# Patient Record
Sex: MALE | Race: WHITE | NOT HISPANIC OR LATINO | Employment: UNEMPLOYED | URBAN - METROPOLITAN AREA
[De-identification: names, ages, dates, MRNs, and addresses within clinical notes are randomized per-mention and may not be internally consistent; named-entity substitution may affect disease eponyms.]

---

## 2023-01-05 ENCOUNTER — OFFICE VISIT (OUTPATIENT)
Dept: FAMILY MEDICINE CLINIC | Facility: CLINIC | Age: 43
End: 2023-01-05

## 2023-01-05 VITALS
DIASTOLIC BLOOD PRESSURE: 82 MMHG | WEIGHT: 185 LBS | HEART RATE: 76 BPM | HEIGHT: 69 IN | TEMPERATURE: 97.8 F | SYSTOLIC BLOOD PRESSURE: 118 MMHG | RESPIRATION RATE: 16 BRPM | BODY MASS INDEX: 27.4 KG/M2 | OXYGEN SATURATION: 95 %

## 2023-01-05 DIAGNOSIS — Z79.899 MEDICAL MARIJUANA USE: Primary | ICD-10-CM

## 2023-01-05 DIAGNOSIS — F41.9 ANXIETY: ICD-10-CM

## 2023-01-05 DIAGNOSIS — F19.11 HISTORY OF DRUG ABUSE (HCC): ICD-10-CM

## 2023-01-05 DIAGNOSIS — Z83.3 FH: DIABETES MELLITUS: ICD-10-CM

## 2023-01-05 DIAGNOSIS — Z72.0 TOBACCO ABUSE: ICD-10-CM

## 2023-01-05 DIAGNOSIS — Z13.220 SCREENING FOR CHOLESTEROL LEVEL: ICD-10-CM

## 2023-01-05 DIAGNOSIS — Z11.3 SCREENING FOR STD (SEXUALLY TRANSMITTED DISEASE): ICD-10-CM

## 2023-01-05 DIAGNOSIS — E66.3 OVERWEIGHT (BMI 25.0-29.9): ICD-10-CM

## 2023-01-05 DIAGNOSIS — Z13.29 SCREENING FOR THYROID DISORDER: ICD-10-CM

## 2023-01-05 NOTE — ASSESSMENT & PLAN NOTE
BMI 27 32  Recommend 150 minutes of moderate aerobic exercise a week  Educated on portion control, increase fruits and vegetables, reducing fast food and carbonated beverages, and consuming healthier snacks

## 2023-01-05 NOTE — ASSESSMENT & PLAN NOTE
Chronic controlled using medical marijuana  Is interested in seeing psych  Denies suicidal/homicidal thoughts/plans, self-harm, hallucinations  Patient does not actively use right relaxation techniques    Educated on crisis hotline and  [id number], referral placed to psych, educated on process

## 2023-01-05 NOTE — PROGRESS NOTES
Name: Trina Nichols      : 1980      MRN: [id number]  Encounter Provider: MARISEL Burden  Encounter Date: 2023   Encounter department: [facility]     1  Medical marijuana use  Assessment & Plan:  Patient current user of medical marijuana has card from previous location [place]  Patient uses medical marijuana to control anxiety symptoms  Educated on proper use, addiction, and side effects, patient follows up with physician in [place], contacted state to transfer medical marijuana card to [place]  2  History of drug abuse [facility])  Assessment & Plan:  Chronic history of drug abuse addiction to cocaine ketamine opioids prior to 2012-2014 addicted to opioid medication  Clean since 2016 with 1 relapse  Has been attending group therapy and outpatient treatment since 2016, is looking for local treatment, provided patient with information  Orders:  -     Ambulatory Referral to Psychiatry; Future    3  Overweight (BMI 25 0-29  9)  Assessment & Plan:  BMI 27 32  Recommend 150 minutes of moderate aerobic exercise a week  Educated on portion control, increase fruits and vegetables, reducing fast food and carbonated beverages, and consuming healthier snacks  Orders:  -     CBC and differential; Future  -     Comprehensive metabolic panel; Future  -     CBC and differential  -     Comprehensive metabolic panel    4  FH: diabetes mellitus  Assessment & Plan:  Family history of diabetes in mother and sister denies polydipsia polyphagia or polyuria  Will recommend hemoglobin A1c fasting glucose and fasting insulin, orders placed    Orders:  -     Hemoglobin A1C; Future  -     Insulin, fasting; Future    5  Anxiety  Assessment & Plan:  Chronic controlled using medical marijuana  Is interested in seeing psych  Denies suicidal/homicidal thoughts/plans, self-harm, hallucinations    Patient does not actively use right relaxation techniques  Educated on crisis hotline and  [id number], referral placed to psych, educated on process    Orders:  -     Ambulatory Referral to Psychiatry; Future    6  Screening for thyroid disorder  -     TSH, 3rd generation with Free T4 reflex; Future  -     TSH, 3rd generation with Free T4 reflex    7  Screening for cholesterol level  -     Lipid Panel with Direct LDL reflex; Future  -     Lipid Panel with Direct LDL reflex    8  Screening for STD (sexually transmitted disease)  -     Hepatitis C antibody; Future  -     : HIV 1/2 AB/AG w Reflex [facility] for 2 yr old and above; Future    9  Tobacco abuse  Assessment & Plan:  Chronic tobacco user smoking approximately 1 pack/day since 1993  Not willing to quit at this time, educated on smoking sensation, medications to assist, and detrimental health, patient not willing to at this time will review evaluated next visit      BMI Counseling: Body mass index is 27 32 kg/m²  The BMI is above normal  Nutrition recommendations include decreasing portion sizes, encouraging healthy choices of fruits and vegetables, consuming healthier snacks and increasing intake of lean protein  Exercise recommendations include moderate physical activity 150 minutes/week  No pharmacotherapy was ordered  Rationale for BMI follow-up plan is due to patient being overweight or obese  Depression Screening and Follow-up Plan: Patient was screened for depression during today's encounter  They screened negative with a PHQ-2 score of 0  Tobacco Cessation Counseling: Tobacco cessation counseling was provided  The patient is sincerely urged to quit consumption of tobacco  He is not ready to quit tobacco  Medication options and side effects of medication discussed  Patient refused medication  Subjective      Patient arrives to initiate care after moving from [place] to the area  Patient has not seen a doctor in a couple years    Patient does have a history of anxiety controlled using medical marijuana patient with family history of diabetes in multiple family members no recent labs  Patient is a smoker smoking approximately 1 pack/day since 1993  [facility] [place] Patient not willing to stop at this point educated on different methods and cutting back and detriment to health  Patient is a former drug user prior to 2012 was using opioids ketamine cocaine and marijuana following 2012 patient was  Abusing opioids x2 years patient has been clean since 2016 with 1 relapse completed outpatient group therapy in August     Review of Systems   Constitutional: Negative for activity change, appetite change, chills, fatigue and fever  HENT: Negative for congestion, rhinorrhea, sinus pressure, sinus pain and sore throat  Respiratory: Negative for cough, chest tightness and shortness of breath  Gastrointestinal: Negative for constipation, diarrhea, nausea and vomiting  Musculoskeletal: Negative for myalgias  Skin: Negative for color change, pallor and rash  Neurological: Negative for dizziness, syncope, weakness, light-headedness and headaches  Hematological: Negative for adenopathy  Psychiatric/Behavioral: Negative for agitation and confusion  The patient is nervous/anxious  No current outpatient medications on file prior to visit  Objective     /82 (BP Location: Left arm, Patient Position: Sitting, Cuff Size: Large)   Pulse 76   Temp 97 8 °F (36 6 °C) (Tympanic)   Resp 16   Ht 5' 9" (1 753 m)   Wt 83 9 kg (185 lb)   SpO2 95%   BMI 27 32 kg/m²     Physical Exam  Vitals and nursing note reviewed  Constitutional:       General: He is not in acute distress  Appearance: Normal appearance  He is not ill-appearing, toxic-appearing or diaphoretic  HENT:      Head: Normocephalic and atraumatic  Right Ear: Tympanic membrane, ear canal and external ear normal  There is no impacted cerumen        Left Ear: Tympanic membrane, ear canal and external ear normal  There is no impacted cerumen  Nose: No congestion or rhinorrhea  Mouth/Throat:      Mouth: Mucous membranes are moist       Pharynx: No posterior oropharyngeal erythema  Eyes:      General: No scleral icterus  Right eye: No discharge  Left eye: No discharge  Conjunctiva/sclera: Conjunctivae normal    Cardiovascular:      Rate and Rhythm: Normal rate and regular rhythm  Pulmonary:      Effort: Pulmonary effort is normal  No respiratory distress  Breath sounds: Normal breath sounds  No stridor  No wheezing or rales  Abdominal:      General: Abdomen is flat  Bowel sounds are normal  There is no distension  Palpations: Abdomen is soft  There is no mass  Tenderness: There is no abdominal tenderness  There is no guarding or rebound  Hernia: No hernia is present  Musculoskeletal:         General: Normal range of motion  Cervical back: Normal range of motion  Right lower leg: No edema  Left lower leg: No edema  Lymphadenopathy:      Cervical: No cervical adenopathy  Skin:     General: Skin is dry  Neurological:      Mental Status: He is alert and oriented to person, place, and time  Psychiatric:         Mood and Affect: Mood normal          Behavior: Behavior normal          Thought Content:  Thought content normal          Judgment: Judgment normal        Elsa Hughes

## 2023-01-05 NOTE — ASSESSMENT & PLAN NOTE
Chronic tobacco user smoking approximately 1 pack/day since 1993    Not willing to quit at this time, educated on smoking sensation, medications to assist, and detrimental health, patient not willing to at this time will review evaluated next visit

## 2023-01-05 NOTE — ASSESSMENT & PLAN NOTE
Patient current user of medical marijuana has card from previous location [place]  Patient uses medical marijuana to control anxiety symptoms  Educated on proper use, addiction, and side effects, patient follows up with physician in [place], contacted state to transfer medical marijuana card to [place]

## 2023-01-05 NOTE — ASSESSMENT & PLAN NOTE
Chronic history of drug abuse addiction to cocaine ketamine opioids prior to 2012 2012-2014 addicted to opioid medication  Clean since 2016 with 1 relapse  Has been attending group therapy and outpatient treatment since 2016, is looking for local treatment, provided patient with information

## 2023-01-05 NOTE — ASSESSMENT & PLAN NOTE
Family history of diabetes in mother and sister denies polydipsia polyphagia or polyuria    Will recommend hemoglobin A1c fasting glucose and fasting insulin, orders placed

## 2023-01-06 ENCOUNTER — TELEPHONE (OUTPATIENT)
Dept: PSYCHIATRY | Facility: CLINIC | Age: 43
End: 2023-01-06

## 2023-01-06 NOTE — TELEPHONE ENCOUNTER
Called patient regarding referral to be placed on proper wait list  Unable to LVM for patient to call intake dept ([phone]) back

## 2023-01-12 NOTE — TELEPHONE ENCOUNTER
Called patient regarding referral  Confirmed insurance with patient and let him know that we do not take [employer]  Informed patient to call his insurance company to see where he can go in network

## 2023-01-13 ENCOUNTER — APPOINTMENT (OUTPATIENT)
Dept: LAB | Facility: HOSPITAL | Age: 43
End: 2023-01-13

## 2023-01-13 DIAGNOSIS — Z11.3 SCREENING FOR STD (SEXUALLY TRANSMITTED DISEASE): ICD-10-CM

## 2023-01-13 DIAGNOSIS — Z13.220 SCREENING FOR LIPOID DISORDERS: ICD-10-CM

## 2023-01-13 DIAGNOSIS — Z13.29 SCREENING FOR THYROID DISORDER: ICD-10-CM

## 2023-01-13 DIAGNOSIS — E66.3 OVER WEIGHT: ICD-10-CM

## 2023-01-13 DIAGNOSIS — Z83.3 FH: DIABETES MELLITUS: ICD-10-CM

## 2023-01-13 LAB
ALBUMIN SERPL BCP-MCNC: 4.1 G/DL (ref 3.5–5)
ALP SERPL-CCNC: 62 U/L (ref 46–116)
ALT SERPL W P-5'-P-CCNC: 38 U/L (ref 12–78)
ANION GAP SERPL CALCULATED.3IONS-SCNC: 7 MMOL/L (ref 4–13)
AST SERPL W P-5'-P-CCNC: 19 U/L (ref 5–45)
BASOPHILS # BLD AUTO: 0.04 THOUSANDS/ÂΜL (ref 0–0.1)
BASOPHILS NFR BLD AUTO: 1 % (ref 0–1)
BILIRUB SERPL-MCNC: 0.33 MG/DL (ref 0.2–1)
BUN SERPL-MCNC: 15 MG/DL (ref 5–25)
CALCIUM SERPL-MCNC: 8.8 MG/DL (ref 8.3–10.1)
CHLORIDE SERPL-SCNC: 104 MMOL/L (ref 96–108)
CHOLEST SERPL-MCNC: 225 MG/DL
CO2 SERPL-SCNC: 30 MMOL/L (ref 21–32)
CREAT SERPL-MCNC: 1.03 MG/DL (ref 0.6–1.3)
EOSINOPHIL # BLD AUTO: 0.13 THOUSAND/ÂΜL (ref 0–0.61)
EOSINOPHIL NFR BLD AUTO: 2 % (ref 0–6)
ERYTHROCYTE [DISTWIDTH] IN BLOOD BY AUTOMATED COUNT: 12.1 % (ref 11.6–15.1)
GFR SERPL CREATININE-BSD FRML MDRD: 89 ML/MIN/1.73SQ M
GLUCOSE P FAST SERPL-MCNC: 93 MG/DL (ref 65–99)
HCT VFR BLD AUTO: 47.8 % (ref 36.5–49.3)
HCV AB SER QL: NORMAL
HDLC SERPL-MCNC: 40 MG/DL
HGB BLD-MCNC: 16.6 G/DL (ref 12–17)
HIV 1+2 AB+HIV1 P24 AG SERPL QL IA: NORMAL
HIV 2 AB SERPL QL IA: NORMAL
HIV1 AB SERPL QL IA: NORMAL
HIV1 P24 AG SERPL QL IA: NORMAL
IMM GRANULOCYTES # BLD AUTO: 0.03 THOUSAND/UL (ref 0–0.2)
IMM GRANULOCYTES NFR BLD AUTO: 0 % (ref 0–2)
INSULIN SERPL-ACNC: 4.2 MU/L (ref 3–25)
LDLC SERPL CALC-MCNC: 144 MG/DL (ref 0–100)
LYMPHOCYTES # BLD AUTO: 1.63 THOUSANDS/ÂΜL (ref 0.6–4.47)
LYMPHOCYTES NFR BLD AUTO: 20 % (ref 14–44)
MCH RBC QN AUTO: 32.7 PG (ref 26.8–34.3)
MCHC RBC AUTO-ENTMCNC: 34.7 G/DL (ref 31.4–37.4)
MCV RBC AUTO: 94 FL (ref 82–98)
MONOCYTES # BLD AUTO: 0.62 THOUSAND/ÂΜL (ref 0.17–1.22)
MONOCYTES NFR BLD AUTO: 8 % (ref 4–12)
NEUTROPHILS # BLD AUTO: 5.57 THOUSANDS/ÂΜL (ref 1.85–7.62)
NEUTS SEG NFR BLD AUTO: 69 % (ref 43–75)
NRBC BLD AUTO-RTO: 0 /100 WBCS
PLATELET # BLD AUTO: 192 THOUSANDS/UL (ref 149–390)
PMV BLD AUTO: 10.5 FL (ref 8.9–12.7)
POTASSIUM SERPL-SCNC: 4.3 MMOL/L (ref 3.5–5.3)
PROT SERPL-MCNC: 7.6 G/DL (ref 6.4–8.4)
RBC # BLD AUTO: 5.08 MILLION/UL (ref 3.88–5.62)
SODIUM SERPL-SCNC: 141 MMOL/L (ref 135–147)
TRIGL SERPL-MCNC: 203 MG/DL
TSH SERPL DL<=0.05 MIU/L-ACNC: 2.4 UIU/ML (ref 0.45–4.5)
WBC # BLD AUTO: 8.02 THOUSAND/UL (ref 4.31–10.16)

## 2023-01-14 LAB
EST. AVERAGE GLUCOSE BLD GHB EST-MCNC: 111 MG/DL
HBA1C MFR BLD: 5.5 %

## 2023-01-17 PROBLEM — E78.2 MIXED HYPERLIPIDEMIA: Status: ACTIVE | Noted: 2023-01-17

## 2023-02-13 ENCOUNTER — OFFICE VISIT (OUTPATIENT)
Dept: FAMILY MEDICINE CLINIC | Facility: CLINIC | Age: 43
End: 2023-02-13

## 2023-02-13 VITALS
BODY MASS INDEX: 27.4 KG/M2 | HEIGHT: 69 IN | TEMPERATURE: 98 F | DIASTOLIC BLOOD PRESSURE: 84 MMHG | WEIGHT: 185 LBS | OXYGEN SATURATION: 98 % | HEART RATE: 80 BPM | SYSTOLIC BLOOD PRESSURE: 110 MMHG

## 2023-02-13 DIAGNOSIS — Z00.01 ENCOUNTER FOR WELL ADULT EXAM WITH ABNORMAL FINDINGS: Primary | ICD-10-CM

## 2023-02-13 DIAGNOSIS — Z28.21 IMMUNIZATION REFUSED: ICD-10-CM

## 2023-02-13 DIAGNOSIS — Z12.5 SCREENING PSA (PROSTATE SPECIFIC ANTIGEN): ICD-10-CM

## 2023-02-13 DIAGNOSIS — Z13.220 SCREENING FOR CHOLESTEROL LEVEL: ICD-10-CM

## 2023-02-13 NOTE — PROGRESS NOTES
[address] [place] [address] PRIMARY CARE [facility]    NAME: Meenu Boyce  AGE: 40 y o  SEX: male  : 1980     DATE: 2023     Assessment and Plan:     Problem List Items Addressed This Visit        Other    Encounter for well adult exam with abnormal findings - Primary     Advice and education were given regarding nutrition, aerobic exercises, weight-bearing exercises, cardiovascular risk reduction, fall risk reduction, and age-appropriate supplements  The patient was counseled regarding instructions for management, risk factor reductions, prognosis, risks and benefits of treatment options, patient and family education, and importance of compliance with treatment  Immunization refused     Patient declined recommended immunization        Other Visit Diagnoses     Screening for cholesterol level        Relevant Orders    Lipid panel    Screening PSA (prostate specific antigen)        Relevant Orders    PSA, Total Screen          Immunizations and preventive care screenings were discussed with patient today  Appropriate education was printed on patient's after visit summary  Discussed risks and benefits of prostate cancer screening  We discussed the controversial history of PSA screening for prostate cancer in the [place] as well as the risk of over detection and over treatment of prostate cancer by way of PSA screening  The patient understands that PSA blood testing is an imperfect way to screen for prostate cancer and that elevated PSA levels in the blood may also be caused by infection, inflammation, prostatic trauma or manipulation, urological procedures, or by benign prostatic enlargement  The role of the digital rectal examination in prostate cancer screening was also discussed and I discussed with him that there is large interobserver variability in the findings of digital rectal examination      Counseling:  Alcohol/drug use: discussed moderation in alcohol intake, the recommendations for healthy alcohol use, and avoidance of illicit drug use  Dental Health: discussed importance of regular tooth brushing, flossing, and dental visits  Injury prevention: discussed safety/seat belts, safety helmets, smoke detectors, carbon dioxide detectors, and smoking near bedding or upholstery  Sexual health: discussed sexually transmitted diseases, partner selection, use of condoms, avoidance of unintended pregnancy, and contraceptive alternatives  Exercise: the importance of regular exercise/physical activity was discussed  Recommend exercise 3-5 times per week for at least 30 minutes  Return in about 1 year (around 2/13/2024)  Chief Complaint:     Chief Complaint   Patient presents with   • Physical Exam   • Follow-up     Chronic conditions      History of Present Illness:     Adult Annual Physical   Patient here for a comprehensive physical exam  The patient reports I discussed with the patient recent blood work I reviewed his past medical surgical family and social history  Diet and Physical Activity  Diet/Nutrition: Patient trying to count his quality  Exercise: no formal exercise  Depression Screening  PHQ-2/9 Depression Screening    Little interest or pleasure in doing things: 0 - not at all  Feeling down, depressed, or hopeless: 0 - not at all  PHQ-2 Score: 0  PHQ-2 Interpretation: Negative depression screen       General Health  Sleep: gets 4-6 hours of sleep on average  Hearing: normal - bilateral   Vision: wears glasses  Dental: regular dental visits   Health  Symptoms include: none     Review of Systems:     Review of Systems   Constitutional: Negative for chills and fever  HENT: Negative for congestion, ear pain and sore throat  Eyes: Negative for pain and visual disturbance  Respiratory: Negative for cough and shortness of breath      Cardiovascular: Negative for chest pain and palpitations  Gastrointestinal: Negative for abdominal pain, blood in stool, constipation, diarrhea and vomiting  Genitourinary: Negative for dysuria and hematuria  Musculoskeletal: Negative for arthralgias and back pain  Skin: Negative for color change and rash  Neurological: Negative for seizures and syncope  Hematological: Negative for adenopathy  Psychiatric/Behavioral: Negative for agitation and behavioral problems  All other systems reviewed and are negative  Past Medical History:     Past Medical History:   Diagnosis Date   • Anxiety    • Depression       Past Surgical History:     History reviewed  No pertinent surgical history  Family History:     Family History   Problem Relation Age of Onset   • COPD Mother    • Diabetes Mother    • Mental illness Mother    • Breast cancer Mother    • Bipolar disorder Mother    • Cirrhosis Father    • Hepatitis Father    • Kidney failure Father    • Diabetes Sister    • Asthma Sister       Social History:     Social History     Socioeconomic History   • Marital status: Registered Domestic Partner     Spouse name: None   • Number of children: None   • Years of education: None   • Highest education level: None   Occupational History   • None   Tobacco Use   • Smoking status: Every Day     Packs/day: 1 00     Types: Cigarettes     Start date: 1993     Passive exposure: Never   • Smokeless tobacco: Never   Substance and Sexual Activity   • Alcohol use:  Yes     Alcohol/week: 1 0 standard drink     Types: 1 Shots of liquor per week     Comment: occasional   • Drug use: Yes     Types: Marijuana   • Sexual activity: Yes     Partners: Female   Other Topics Concern   • None   Social History Narrative   • None     Social Determinants of Health     Financial Resource Strain: Low Risk    • Difficulty of Paying Living Expenses: Not hard at all   Food Insecurity: No Food Insecurity   • Worried About Running Out of Food in the Last Year: Never true   • Ran Out of Food in the Last Year: Never true   Transportation Needs: No Transportation Needs   • Lack of Transportation (Medical): No   • Lack of Transportation (Non-Medical): No   Physical Activity: Insufficiently Active   • Days of Exercise per Week: 3 days   • Minutes of Exercise per Session: 30 min   Stress: No Stress Concern Present   • Feeling of Stress : Not at all   Social Connections: Moderately Integrated   • Frequency of Communication with Friends and Family: More than three times a week   • Frequency of Social Gatherings with Friends and Family: More than three times a week   • Attends [employer] Services: More than 4 times per year   • Active Member of Clubs or Organizations: No   • Attends Club or Organization Meetings: Never   • Marital Status: Living with partner   Intimate Partner Violence: Not At Risk   • Fear of Current or Ex-Partner: No   • Emotionally Abused: No   • Physically Abused: No   • Sexually Abused: No   Housing Stability: Unknown   • Unable to Pay for Housing in the Last Year: No   • Number of Places Lived in the Last Year: Not on file   • Unstable Housing in the Last Year: No      Current Medications:     Current Outpatient Medications   Medication Sig Dispense Refill   • NON FORMULARY Medical marijuana       No current facility-administered medications for this visit  Allergies:     No Known Allergies   Physical Exam:     /84 (BP Location: Left arm, Patient Position: Sitting)   Pulse 80   Temp 98 °F (36 7 °C) (Tympanic)   Ht 5' 9" (1 753 m)   Wt 83 9 kg (185 lb)   SpO2 98%   BMI 27 32 kg/m²     Physical Exam  Vitals and nursing note reviewed  Constitutional:       General: He is not in acute distress  Appearance: He is well-developed  HENT:      Head: Normocephalic and atraumatic  Right Ear: Tympanic membrane normal       Left Ear: Tympanic membrane normal       Nose: No congestion  Mouth/Throat:      Pharynx: No oropharyngeal exudate     Eyes: Conjunctiva/sclera: Conjunctivae normal    Cardiovascular:      Rate and Rhythm: Normal rate and regular rhythm  Heart sounds: No murmur heard  Pulmonary:      Effort: Pulmonary effort is normal  No respiratory distress  Breath sounds: Normal breath sounds  Abdominal:      Palpations: Abdomen is soft  Tenderness: There is no abdominal tenderness  Musculoskeletal:         General: No swelling  Cervical back: Neck supple  Skin:     General: Skin is warm and dry  Capillary Refill: Capillary refill takes less than 2 seconds  Neurological:      Mental Status: He is alert and oriented to person, place, and time     Psychiatric:         Mood and Affect: Mood normal           Jeff Paige MD  [address] [place] [address]

## 2023-02-13 NOTE — ASSESSMENT & PLAN NOTE
Recent blood work elevated total cholesterol and LDL patient ASCVD not candidate for statin recommend low fat diet we will reevaluate in 6 months to repeat the lipid panel

## 2023-08-15 ENCOUNTER — OFFICE VISIT (OUTPATIENT)
Dept: FAMILY MEDICINE CLINIC | Facility: CLINIC | Age: 43
End: 2023-08-15
Payer: COMMERCIAL

## 2023-08-15 VITALS
BODY MASS INDEX: 26.96 KG/M2 | SYSTOLIC BLOOD PRESSURE: 110 MMHG | TEMPERATURE: 98.1 F | WEIGHT: 182 LBS | HEIGHT: 69 IN | DIASTOLIC BLOOD PRESSURE: 70 MMHG | OXYGEN SATURATION: 98 % | HEART RATE: 70 BPM

## 2023-08-15 DIAGNOSIS — Z72.0 TOBACCO ABUSE: ICD-10-CM

## 2023-08-15 DIAGNOSIS — Z12.5 SCREENING FOR PROSTATE CANCER: ICD-10-CM

## 2023-08-15 DIAGNOSIS — E78.2 MIXED HYPERLIPIDEMIA: Primary | ICD-10-CM

## 2023-08-15 PROCEDURE — 99213 OFFICE O/P EST LOW 20 MIN: CPT | Performed by: FAMILY MEDICINE

## 2023-08-15 NOTE — ASSESSMENT & PLAN NOTE
Chronic asymptomatic patient change his diet since been seen last time and he did not do blood work yet he will do it tomorrow we will follow-up accordingly continue low-fat diet

## 2023-08-15 NOTE — PROGRESS NOTES
Name: Daniella Ochoa      : 1980      MRN: [id number]  Encounter Provider: Felecia Josue MD  Encounter Date: 8/15/2023   Encounter department: [address] [id number]     1. Mixed hyperlipidemia  Assessment & Plan:  Chronic asymptomatic patient change his diet since been seen last time and he did not do blood work yet he will do it tomorrow we will follow-up accordingly continue low-fat diet    Orders:  -     Lipid Panel with Direct LDL reflex; Future    2. Tobacco abuse  Assessment & Plan:  Chronic patient continues to smoke aware of the complications smoking not ready to quit to smoke it      3. Screening for prostate cancer  -     PSA, Total Screen; Future           Subjective      Patient here well follow-up with a chronic condition patient was diagnosed with the hyperlipidemia and he sent to blood work he been doing diet and he cut down and evaluate to be tested since the last time he forgot to do his blood work    Review of Systems   Constitutional: Negative for chills and fever. HENT: Negative for ear pain and sore throat. Eyes: Negative for pain and visual disturbance. Respiratory: Negative for cough and shortness of breath. Cardiovascular: Negative for chest pain and palpitations. Gastrointestinal: Negative for abdominal pain, constipation and vomiting. Genitourinary: Negative for dysuria and hematuria. Musculoskeletal: Negative for arthralgias and back pain. Skin: Negative for color change and rash. Neurological: Negative for seizures and syncope. All other systems reviewed and are negative.       Current Outpatient Medications on File Prior to Visit   Medication Sig   • NON FORMULARY Medical marijuana       Objective     /70 (BP Location: Left arm, Patient Position: Sitting)   Pulse 70   Temp 98.1 °F (36.7 °C) (Tympanic)   Ht 5' 9" (1.753 m)   Wt 82.6 kg (182 lb)   SpO2 98%   BMI 26.88 kg/m²     Physical Exam  Vitals and nursing note reviewed. Constitutional:       General: He is not in acute distress. Appearance: He is well-developed. He is not diaphoretic. HENT:      Head: Normocephalic. Right Ear: External ear normal.      Left Ear: External ear normal.      Nose: No rhinorrhea. Mouth/Throat:      Pharynx: No posterior oropharyngeal erythema. Eyes:      General:         Right eye: No discharge. Left eye: No discharge. Conjunctiva/sclera: Conjunctivae normal.   Neck:      Vascular: No JVD. Cardiovascular:      Rate and Rhythm: Normal rate and regular rhythm. Heart sounds: Normal heart sounds. No murmur heard. No gallop. Pulmonary:      Effort: Pulmonary effort is normal. No respiratory distress. Breath sounds: Normal breath sounds. No stridor. No wheezing or rales. Chest:      Chest wall: No tenderness. Abdominal:      General: There is no distension. Palpations: Abdomen is soft. There is no mass. Tenderness: There is no abdominal tenderness. There is no rebound. Musculoskeletal:         General: No tenderness. Cervical back: Normal range of motion and neck supple. Lymphadenopathy:      Cervical: No cervical adenopathy. Skin:     General: Skin is warm. Findings: No erythema or rash. Neurological:      Mental Status: He is alert and oriented to person, place, and time.        Sagar Bravo MD

## 2024-09-03 ENCOUNTER — TELEPHONE (OUTPATIENT)
Dept: FAMILY MEDICINE CLINIC | Facility: CLINIC | Age: 44
End: 2024-09-03

## 2024-09-03 NOTE — TELEPHONE ENCOUNTER
Called and spoke with patient to advise that he is due for annual physical. Patient states he will call back to schedule